# Patient Record
Sex: FEMALE | URBAN - METROPOLITAN AREA
[De-identification: names, ages, dates, MRNs, and addresses within clinical notes are randomized per-mention and may not be internally consistent; named-entity substitution may affect disease eponyms.]

---

## 2019-10-08 ENCOUNTER — APPOINTMENT (RX ONLY)
Dept: URBAN - METROPOLITAN AREA CLINIC 43 | Facility: CLINIC | Age: 54
Setting detail: DERMATOLOGY
End: 2019-10-08

## 2019-10-08 DIAGNOSIS — I89.0 LYMPHEDEMA, NOT ELSEWHERE CLASSIFIED: ICD-10-CM

## 2019-10-08 DIAGNOSIS — C: ICD-10-CM

## 2019-10-08 PROBLEM — C79.9 SECONDARY MALIGNANT NEOPLASM OF UNSPECIFIED SITE: Status: ACTIVE | Noted: 2019-10-08

## 2019-10-08 PROCEDURE — ? COUNSELING

## 2019-10-08 PROCEDURE — ? LOCUM TENENS SERVICE

## 2019-10-08 PROCEDURE — 99203 OFFICE O/P NEW LOW 30 MIN: CPT | Mod: Q6

## 2019-10-08 PROCEDURE — ? ADDITIONAL NOTES

## 2019-10-08 ASSESSMENT — LOCATION ZONE DERM
LOCATION ZONE: TRUNK
LOCATION ZONE: ARM

## 2019-10-08 ASSESSMENT — LOCATION DETAILED DESCRIPTION DERM
LOCATION DETAILED: RIGHT MEDIAL BREAST 2-3:00 REGION
LOCATION DETAILED: RIGHT ANTERIOR PROXIMAL UPPER ARM

## 2019-10-08 ASSESSMENT — LOCATION SIMPLE DESCRIPTION DERM
LOCATION SIMPLE: RIGHT BREAST
LOCATION SIMPLE: RIGHT UPPER ARM

## 2019-10-08 NOTE — PROCEDURE: ADDITIONAL NOTES
Detail Level: Simple
Additional Notes: Patient was accompanied by 2 family members during visit (brother and his wife)\\n\\nPt has hx of breast cancer, chemotherapy, and radiation\\nPt does not reside in [place] - she is visiting for 2 week duration and going back home to [place], [place] on 10/10/2019\\nPt has an oncologist in [place], [place]\\n\\nA copy of immunohistochemistry report was provided by patient \\n- scanned into patient’s chart\\n\\nPt complains of itching and burning of skin \\nAdvised against using a topical steroid due to risk of skin atrophy \\nRecommended using OTC itch relief CeraVe (use up to 4 times daily to itchy areas) and Sarna with menthol (use for burning)\\nAlso recommended using regular moisturizer (Cetaphil) for rest of body, gentle moisturizer for face cleanser \\n\\nPt also reported pain on legs, feet\\n\\nPt wanted a script for an unspecified topical spray that makes her “feel better”\\nPt’s brother stated this is OTC. Pt will call us back with name of this topical and get Dr’s opinion

## 2019-10-08 NOTE — HPI: RASH
How Severe Is Your Rash?: moderate
Is This A New Presentation, Or A Follow-Up?: Rash
Additional History: Pt is receiving chemotherapy for breast cancer.

## 2019-10-08 NOTE — PROCEDURE: MIPS QUALITY
Quality 110: Preventive Care And Screening: Influenza Immunization: Influenza Immunization not Administered because Patient Refused.
Quality 131: Pain Assessment And Follow-Up: Pain assessment using a standardized tool is documented as negative, no follow-up plan required
Quality 130: Documentation Of Current Medications In The Medical Record: Current Medications Documented
Detail Level: Detailed